# Patient Record
Sex: MALE | Race: WHITE | Employment: FULL TIME | URBAN - METROPOLITAN AREA
[De-identification: names, ages, dates, MRNs, and addresses within clinical notes are randomized per-mention and may not be internally consistent; named-entity substitution may affect disease eponyms.]

---

## 2021-01-28 ENCOUNTER — TELEPHONE (OUTPATIENT)
Dept: FAMILY MEDICINE CLINIC | Facility: CLINIC | Age: 56
End: 2021-01-28

## 2021-01-28 NOTE — TELEPHONE ENCOUNTER
Lm on Ruma (wife's) cell to let her know Dr Kiley Daigle would like to see him in office for his visit,patient is scheduled for Feb 19th at 8

## 2021-01-28 NOTE — TELEPHONE ENCOUNTER
Since the patient is new, OV preferred.    If protocol is met, please have him do office visit rather than virtual.

## 2021-01-28 NOTE — TELEPHONE ENCOUNTER
Leatha Lopez (Wife) is calling because Venkata Miller is going to be a new patient to Dr Airam Nielsen, he needs a referral for cataract surgery,  They just switched over to a [facility] insurance, she would like a call to see if a virtural appt can be done or does he need to come in

## 2021-03-03 ENCOUNTER — OFFICE VISIT (OUTPATIENT)
Dept: FAMILY MEDICINE CLINIC | Facility: CLINIC | Age: 56
End: 2021-03-03

## 2021-03-03 VITALS
DIASTOLIC BLOOD PRESSURE: 70 MMHG | HEART RATE: 76 BPM | OXYGEN SATURATION: 98 % | HEIGHT: 75 IN | TEMPERATURE: 98 F | SYSTOLIC BLOOD PRESSURE: 120 MMHG | WEIGHT: 172 LBS | BODY MASS INDEX: 21.39 KG/M2

## 2021-03-03 DIAGNOSIS — Z86.69 HISTORY OF RETINAL DETACHMENT: ICD-10-CM

## 2021-03-03 DIAGNOSIS — Z83.518 FAMILY HISTORY OF RETINAL DETACHMENT: ICD-10-CM

## 2021-03-03 DIAGNOSIS — Z85.828 HISTORY OF SCC (SQUAMOUS CELL CARCINOMA) OF SKIN: ICD-10-CM

## 2021-03-03 DIAGNOSIS — Z85.828 HISTORY OF BASAL CELL CARCINOMA (BCC) OF SKIN: ICD-10-CM

## 2021-03-03 DIAGNOSIS — Z98.890 HISTORY OF DETACHED RETINA REPAIR: ICD-10-CM

## 2021-03-03 DIAGNOSIS — Z86.69 HISTORY OF DETACHED RETINA REPAIR: ICD-10-CM

## 2021-03-03 DIAGNOSIS — H26.9 CATARACT OF RIGHT EYE, UNSPECIFIED CATARACT TYPE: Primary | ICD-10-CM

## 2021-03-03 PROCEDURE — 3078F DIAST BP <80 MM HG: CPT | Performed by: FAMILY MEDICINE

## 2021-03-03 PROCEDURE — 99203 OFFICE O/P NEW LOW 30 MIN: CPT | Performed by: FAMILY MEDICINE

## 2021-03-03 PROCEDURE — 3008F BODY MASS INDEX DOCD: CPT | Performed by: FAMILY MEDICINE

## 2021-03-03 PROCEDURE — 3074F SYST BP LT 130 MM HG: CPT | Performed by: FAMILY MEDICINE

## 2021-03-08 ENCOUNTER — TELEPHONE (OUTPATIENT)
Dept: FAMILY MEDICINE CLINIC | Facility: CLINIC | Age: 56
End: 2021-03-08

## 2021-03-08 NOTE — TELEPHONE ENCOUNTER
Nitin [facility] Clinical Staff  Good morning,     Did Dr. Odessa Cutler refer to Dr. Tung Ruelas?  Or did patient see Christen Love in the office. Kala Ortiz will need to attach documents for [facility] approval.  Please advise.      Thank you     Nj Mack

## 2021-03-09 PROBLEM — I65.29 CAROTID ATHEROSCLEROSIS: Status: ACTIVE | Noted: 2018-12-04

## 2021-03-09 PROBLEM — R73.9 HYPERGLYCEMIA: Status: ACTIVE | Noted: 2018-12-04

## 2021-03-09 PROBLEM — E78.00 HYPERCHOLESTEROLEMIA: Status: ACTIVE | Noted: 2018-12-04

## 2021-03-09 NOTE — PROGRESS NOTES
Lashonda العراقي is a 60year old male. HPI:     New patient. Eyes:  Patient has been seeing Dr. Yessenia Maxwell for retinal detachment and retinal repair.   Dr. Yessenia Maxwell has recommended patient see cataract specialist Dr. Kaleb Rich who patient has seen in the past. Disorder Mother    • Stroke Father    • Breast Cancer Maternal Grandmother    • Heart Disorder Maternal Grandfather    • Heart Disorder Paternal Grandmother      REVIEW OF SYSTEMS:   GENERAL HEALTH: overall feels well, no fever  SKIN: denies any unusual sk Cataract of right eye, unspecified cataract type  Patient referred to cataract specialist, Dr. Tung Ruelas, as recommended by Dr. Odessa Cutler, patient has seen Dr. Tung Ruelas in the past.  - OPHTHALMOLOGY - INTERNAL    2. History of SCC (squamous cell carcinoma) of skin  3.  Hi

## 2021-03-09 NOTE — TELEPHONE ENCOUNTER
Please see note from Dr. Alfred Morales in [address] [place] [address] Rd dated 12/15/2020 in which Dr. Alfred Morales recommends referring patient to a cataract specialist.

## 2021-04-14 ENCOUNTER — OFFICE VISIT (OUTPATIENT)
Dept: FAMILY MEDICINE CLINIC | Facility: CLINIC | Age: 56
End: 2021-04-14

## 2021-04-14 VITALS
OXYGEN SATURATION: 97 % | DIASTOLIC BLOOD PRESSURE: 78 MMHG | BODY MASS INDEX: 21.14 KG/M2 | WEIGHT: 170 LBS | SYSTOLIC BLOOD PRESSURE: 120 MMHG | TEMPERATURE: 99 F | HEART RATE: 85 BPM | HEIGHT: 75 IN

## 2021-04-14 DIAGNOSIS — Z12.5 ENCOUNTER FOR SCREENING FOR MALIGNANT NEOPLASM OF PROSTATE: ICD-10-CM

## 2021-04-14 DIAGNOSIS — F39 MOOD DISORDER (HCC): ICD-10-CM

## 2021-04-14 DIAGNOSIS — Z00.00 ROUTINE GENERAL MEDICAL EXAMINATION AT A HEALTH CARE FACILITY: Primary | ICD-10-CM

## 2021-04-14 DIAGNOSIS — Z12.11 ENCOUNTER FOR SCREENING FOR MALIGNANT NEOPLASM OF COLON: ICD-10-CM

## 2021-04-14 PROCEDURE — 99396 PREV VISIT EST AGE 40-64: CPT | Performed by: FAMILY MEDICINE

## 2021-04-14 PROCEDURE — 3074F SYST BP LT 130 MM HG: CPT | Performed by: FAMILY MEDICINE

## 2021-04-14 PROCEDURE — 99213 OFFICE O/P EST LOW 20 MIN: CPT | Performed by: FAMILY MEDICINE

## 2021-04-14 PROCEDURE — 3078F DIAST BP <80 MM HG: CPT | Performed by: FAMILY MEDICINE

## 2021-04-14 PROCEDURE — 3008F BODY MASS INDEX DOCD: CPT | Performed by: FAMILY MEDICINE

## 2021-04-14 NOTE — PROGRESS NOTES
Kimberly Quintero is a 60year old male   HPI:     Mood disorder:  Worsening anxiety.   Treated for depression and anxiety in the past by a psychiatrist.  Patient previously under the care of a psychiatrist who is not in network under patient's current insura Alcohol use: Yes      Alcohol/week: 3.0 standard drinks      Types: 3 Cans of beer per week    Drug use: Not Currently     Occ: . Marital Status: . Children: 5. Exercise: occ walking.   Diet: eats when he is hungry, does not eat much junk normal.  PSYCH: normal affect, no apparent thought disorder, average judgement and insight      Immunization History  Administered            Date(s) Administered    Covid-19 Vaccine Moderna 100 mcg/0.5 ml                          04/11/2021      FLUZONE 6 Future  -  NAVIGATOR        Orders Placed This Encounter      CBC W Differential W Platelet [E]      Comp Metabolic Panel (14) [E]      Lipid Panel [E]      TSH and Free T4 [E]      PSA (Screening) [E]      Vitamin D, 25-Hydroxy [E]      Vitamin B12 [E]

## 2021-04-20 ENCOUNTER — TELEPHONE (OUTPATIENT)
Dept: FAMILY MEDICINE CLINIC | Facility: CLINIC | Age: 56
End: 2021-04-20

## 2021-04-20 NOTE — TELEPHONE ENCOUNTER
Nelli Rivera,     On 4/19, the following referrals for therapy and psychiatry were provided to the patient:     Psychiatry:   Kush Alexander, Psychiatrist, [address], Nurse Pract